# Patient Record
Sex: MALE | URBAN - METROPOLITAN AREA
[De-identification: names, ages, dates, MRNs, and addresses within clinical notes are randomized per-mention and may not be internally consistent; named-entity substitution may affect disease eponyms.]

---

## 2022-03-04 ENCOUNTER — OFFICE VISIT (OUTPATIENT)
Dept: URBAN - METROPOLITAN AREA CLINIC 22 | Facility: CLINIC | Age: 22
End: 2022-03-04
Payer: COMMERCIAL

## 2022-03-04 DIAGNOSIS — H53.19 OTHER SUBJECTIVE VISUAL DISTURBANCES: Primary | ICD-10-CM

## 2022-03-04 PROCEDURE — 92004 COMPRE OPH EXAM NEW PT 1/>: CPT | Performed by: STUDENT IN AN ORGANIZED HEALTH CARE EDUCATION/TRAINING PROGRAM

## 2022-03-04 ASSESSMENT — INTRAOCULAR PRESSURE
OD: 17
OS: 17

## 2022-03-04 NOTE — IMPRESSION/PLAN
Impression: Other subjective visual disturbances: H53.19. Plan: Pt c/o light sensitivity/migraines, worsening and more frequent over the last 2 years. Denies any hx of head trauma/TBI. Ocular structures appear healthy today OU. 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 Follow-up with PCP for migraines/light sensitivity -- given pt reporting symptoms have been worsening, recommend imaging to rule out neurological etiologies.

## 2022-03-08 ENCOUNTER — TESTING ONLY (OUTPATIENT)
Dept: URBAN - METROPOLITAN AREA CLINIC 22 | Facility: CLINIC | Age: 22
End: 2022-03-08

## 2022-03-08 DIAGNOSIS — H52.13 MYOPIA, BILATERAL: Primary | ICD-10-CM

## 2022-03-08 ASSESSMENT — VISUAL ACUITY
OD: 20/25
OS: 20/25

## 2022-03-08 NOTE — IMPRESSION/PLAN
Impression: Myopia, bilateral: H52.13. Plan: Discussed findings. Demo'd; patient reports improved NVA with near Rx. New computer/near Rx finalized and given to patient.